# Patient Record
Sex: MALE | Race: WHITE | NOT HISPANIC OR LATINO | URBAN - METROPOLITAN AREA
[De-identification: names, ages, dates, MRNs, and addresses within clinical notes are randomized per-mention and may not be internally consistent; named-entity substitution may affect disease eponyms.]

---

## 2022-03-18 DIAGNOSIS — C20 RECTAL CANCER (H): Primary | ICD-10-CM

## 2022-03-21 NOTE — TELEPHONE ENCOUNTER
Diagnosis, Referred by & from: Recurring Rectal Cancer, post AAPR   Appt date: 4/6/2022   NOTES STATUS DETAILS   OFFICE NOTE from referring provider N/A    OFFICE NOTE from other specialist Care Everywhere [facility]:  3/9/22 - PCC OV with KYM Wilkerson  3/12/21 - PCC OV with Dr. Hunter   DISCHARGE SUMMARY from hospital Care Everywhere [facility]:  12/4/21 - Admission with Dr. Simental  3/18/16 - Admission with Dr. Holt  3/2/16 - Admission with Dr. Sheehan   DISCHARGE REPORT from the ER N/A    OPERATIVE REPORT Care Everywhere [facility]:  3/2/16 - OP Note for ABDOMINOPERINEAL RESECTION with Dr. Sheehan  12/22/12 - OP Note for TRANSANAL EXCISION OF RECTAL VILLOUS, PROCTOSCOPY with Dr. Sheehan   MEDICATION LIST Care Everywhere    LABS     ANAL PAP/CEA [facility]:  (Critical access hospital) 4/2/22 - CEA    [facility]:  3/9/22 - CEA  12/6/21 - CEA     BIOPSIES/PATHOLOGY RELATED TO DIAGNOSIS Received [facility]:  3/11/16 - Colon Biopsy (Case: [id number])  12/24/12 - Rectal Biopsy (Case: [id number])   DIAGNOSTIC PROCEDURES     COLONOSCOPY Care Everywhere [facility]:  12/9/19 - Colonoscopy  6/23/17 - Colonoscopy  4/4/17 - Colonoscopy   FLEX SIGMOIDOSCOPY Care Everywhere [facility]:  12/20/12 - Flexible Sigmoidoscopy   IMAGING (DISC & REPORT)      CT Received [facility]:  3/18/22 - PET/CT  12/4/21 - CT Abd/Pelvis  3/22/21 - CT Chest/Abd/Pelvis  8/11/20 - CT Chest/Abd/Pelvis  4/8/19 - CT Chest/Abd/Pelvis  11/13/17 - CT Chest/Abd/Pelvis  3/24/17 - CT Chest/Abd/Pelvis   MRI [facility]:  (Critical access hospital) 4/2/22 - MRI Rectum     Records Requested  03/21/22    Facility  [facility]  Fax: [phone]  [facility]  Fax: [phone]   Outcome * 3/21/22 9:10 AM Faxed urg req to [facility] for records to be faxed to the clinic. - Thelma    * 3/25/22 7:47 AM Images received from [facility] and attached to the patient in PACs.  Faxed 2nd urg req to [facility] and messaged RN about pathology. - Thelma    * 3/30/22 7:48 AM  Records noted in [facility] are actually the [facility] records. No unique records. - Thelma

## 2022-03-25 NOTE — PROGRESS NOTES
Colon and Rectal Surgery Clinic Note    RE: Yariel Mancini.  : 1960.  MANOLO: 2022.    Reason for visit: Recurrent rectal cancer.     HPI: 61 year old male who was originally diagnosed with a large tubulovillous adenoma in 2012 and underwent transanal excision for this.  Final pathology showed a 1.4 cm tubulovillous adenoma with a foci of adenocarcinoma (T1 NX M0 lesion) with no high risk features although there was a 5 mm deep margin.  Derian subsequently underwent long course chemoradiotherapy that was completed in 2013.  Unfortunately, he was diagnosed with a local recurrence in  and underwent APR by Dr. Sheehan on 3/2/2016.  The perineal defect was not closed with a flap.  Final pathology showed a 1.8 cm mucinous adenocarcinoma (low-grade) with 0 out of 14 lymph nodes involved and negative margins.  The radial margin was the closest at 4 mm. Derian did quite well after this and did not receive any further chemotherapy.  He had adequate surveillance with no evidence of recurrent disease until late  when he started having obstructive symptoms.  He was admitted to [facility] with a small bowel obstruction that responded well to NG tube decompression.  He does continue to have intermittent mild obstructive symptoms that relieved with dietary modifications, mainly low residue diet.  He also does weekly stoma irrigation which has helped as well.  This has been associated with a mild increase in his CEA, going from 0.6-0.9.  This also has been associated with radiologic evidence of possible recurrence of his rectal cancer at the S3/S4 level.  Otherwise he denies any issues with weight loss, urinary dysfunction, stoma function.  Denies any pouching issues.  No blood per stoma.  Derian is otherwise healthy and does not have any major medical issues.  His family history is significant for colon cancer in his father and paternal grandfather.  He did have full genetic testing at  "the time of his initial cancer diagnosis which was negative per the patient.      12/9/19 Colonoscopy        12/4/21 CT A/P      3/18/22 PET Whole Body      4/2/22 MR Rectum:  IMPRESSION:   In this patient with a history of rectal carcinoma status post  abdominoperineal resection and adjuvant chemoradiation therapy:     1. Findings suspicious for disease recurrence. Peripherally enhancing  mass in the soft tissues anterior to the sacrum with invasion of S4  with likely involvement of the left S3 and S4 nerves as they exit the  sacrum.  2. No suspicious perirectal lymph nodes.      CEA (4/2/22): 1.6.    Medical history:  No past medical history on file.    Surgical history:  No past surgical history on file.    Family history:  No family history on file.    Medications:  Current Outpatient Medications   Medication Sig Dispense Refill     calcium carbonate 500 mg, elemental, 1250 (500 Ca) MG tablet chewable Take 2 tablets by mouth       diphenhydrAMINE (BENADRYL) 25 MG capsule Take 25 mg by mouth       hydrochlorothiazide (HYDRODIURIL) 25 MG tablet Take 25 mg by mouth       ibuprofen (ADVIL/MOTRIN) 200 MG tablet Take 600 mg by mouth       melatonin 1 MG TABS tablet Take 1 mg by mouth nightly as needed for sleep       pseudoePHEDrine (SUDAFED) 30 MG tablet Take 60 mg by mouth         Allergies:  No Known Allergies    Social history:   Social History     Tobacco Use     Smoking status: Never Smoker     Smokeless tobacco: Never Used   Substance Use Topics     Alcohol use: Not on file     Marital status: .    Physical Examination:  BP (!) 131/92 (BP Location: Left arm, Patient Position: Sitting, Cuff Size: Adult Regular)   Pulse 104   Ht 5' 11\"   Wt 200 lb 14.4 oz   SpO2 97%   BMI 28.02 kg/m    General: Well hydrated. No acute distress.    Investigations:  None.    Procedures:  None.    ASSESSMENT  61-year-old male with recurrent rectal cancer status post initial transanal excision of a T1 lesion with close " margins with subsequent adjuvant chemoradiotherapy.  After 2 years developed recurrent disease and underwent APR. unfortunately he has developed another recurrence after 6 years with appropriate surveillance.  This is a posterior recurrence that appears to be confined to the ventral surface of S3/S4.  No clear evidence of distant metastatic disease.  Good functional status. Risks, benefits, and alternatives of operative treatment were thoroughly discussed with the patient, he/she understands these well and agrees to proceed.    PLAN  1.  We will discuss at multidisciplinary rectal cancer conference.  2.  Schedule colonoscopy with me.    3.  Refer to neurosurgery/Ortho and Plastic Surgery for potential upcoming posterior pelvic exenteration with sacrectomy and pelvic reconstruction.  4.  Schedule cystoscopy with bilateral ureteral stents, open excision of pelvic recurrence, possible small bowel resection, possible sacrectomy, possible flap reconstruction, possible HIPEC.  Will need PAC, full set of labs, and mechanical bowel prep with antibiotics.    60 minutes spent on the date of encounter (excluding time performing procedures with or without biopsy) performing chart review, history and exam, documentation and further activities as noted above.      Sanjiv Davis M.D., M.Sc.     Division of Colon and Rectal Surgery  [facility]    Referring Provider:  Timoteo Sheehan MD     Primary Care Provider:  No primary care provider on file.

## 2022-04-02 ENCOUNTER — ANCILLARY PROCEDURE (OUTPATIENT)
Dept: MRI IMAGING | Facility: CLINIC | Age: 62
End: 2022-04-02
Attending: COLON & RECTAL SURGERY
Payer: COMMERCIAL

## 2022-04-02 ENCOUNTER — LAB (OUTPATIENT)
Dept: LAB | Facility: CLINIC | Age: 62
End: 2022-04-02
Payer: COMMERCIAL

## 2022-04-02 DIAGNOSIS — C20 RECTAL CANCER (H): ICD-10-CM

## 2022-04-02 LAB
ALBUMIN SERPL-MCNC: 3.2 G/DL (ref 3.4–5)
ALP SERPL-CCNC: 49 U/L (ref 40–150)
ALT SERPL W P-5'-P-CCNC: 42 U/L (ref 0–70)
ANION GAP SERPL CALCULATED.3IONS-SCNC: 9 MMOL/L (ref 3–14)
AST SERPL W P-5'-P-CCNC: 20 U/L (ref 0–45)
BASOPHILS # BLD AUTO: 0 10E3/UL (ref 0–0.2)
BASOPHILS NFR BLD AUTO: 1 %
BILIRUB SERPL-MCNC: 0.3 MG/DL (ref 0.2–1.3)
BUN SERPL-MCNC: 11 MG/DL (ref 7–30)
CALCIUM SERPL-MCNC: 8.3 MG/DL (ref 8.5–10.1)
CEA SERPL-MCNC: 1.6 UG/L (ref 0–2.5)
CHLORIDE BLD-SCNC: 105 MMOL/L (ref 94–109)
CO2 SERPL-SCNC: 28 MMOL/L (ref 20–32)
CREAT SERPL-MCNC: 0.95 MG/DL (ref 0.66–1.25)
EOSINOPHIL # BLD AUTO: 0.1 10E3/UL (ref 0–0.7)
EOSINOPHIL NFR BLD AUTO: 1 %
ERYTHROCYTE [DISTWIDTH] IN BLOOD BY AUTOMATED COUNT: 13.2 % (ref 10–15)
GFR SERPL CREATININE-BSD FRML MDRD: >90 ML/MIN/1.73M2
GLUCOSE BLD-MCNC: 97 MG/DL (ref 70–99)
HCT VFR BLD AUTO: 44.5 % (ref 40–53)
HGB BLD-MCNC: 14.7 G/DL (ref 13.3–17.7)
IMM GRANULOCYTES # BLD: 0 10E3/UL
IMM GRANULOCYTES NFR BLD: 1 %
LYMPHOCYTES # BLD AUTO: 1.4 10E3/UL (ref 0.8–5.3)
LYMPHOCYTES NFR BLD AUTO: 26 %
MCH RBC QN AUTO: 28.5 PG (ref 26.5–33)
MCHC RBC AUTO-ENTMCNC: 33 G/DL (ref 31.5–36.5)
MCV RBC AUTO: 86 FL (ref 78–100)
MONOCYTES # BLD AUTO: 0.5 10E3/UL (ref 0–1.3)
MONOCYTES NFR BLD AUTO: 10 %
NEUTROPHILS # BLD AUTO: 3.4 10E3/UL (ref 1.6–8.3)
NEUTROPHILS NFR BLD AUTO: 61 %
NRBC # BLD AUTO: 0 10E3/UL
NRBC BLD AUTO-RTO: 0 /100
PLATELET # BLD AUTO: 166 10E3/UL (ref 150–450)
POTASSIUM BLD-SCNC: 3.7 MMOL/L (ref 3.4–5.3)
PROT SERPL-MCNC: 6.5 G/DL (ref 6.8–8.8)
RBC # BLD AUTO: 5.16 10E6/UL (ref 4.4–5.9)
SODIUM SERPL-SCNC: 142 MMOL/L (ref 133–144)
WBC # BLD AUTO: 5.4 10E3/UL (ref 4–11)

## 2022-04-02 PROCEDURE — 80053 COMPREHEN METABOLIC PANEL: CPT | Performed by: PATHOLOGY

## 2022-04-02 PROCEDURE — 82378 CARCINOEMBRYONIC ANTIGEN: CPT | Mod: 90 | Performed by: PATHOLOGY

## 2022-04-02 PROCEDURE — A9585 GADOBUTROL INJECTION: HCPCS | Performed by: RADIOLOGY

## 2022-04-02 PROCEDURE — 84134 ASSAY OF PREALBUMIN: CPT | Mod: 90 | Performed by: PATHOLOGY

## 2022-04-02 PROCEDURE — 85025 COMPLETE CBC W/AUTO DIFF WBC: CPT | Performed by: PATHOLOGY

## 2022-04-02 PROCEDURE — 74183 MRI ABD W/O CNTR FLWD CNTR: CPT | Mod: GC | Performed by: RADIOLOGY

## 2022-04-02 PROCEDURE — 36415 COLL VENOUS BLD VENIPUNCTURE: CPT | Performed by: PATHOLOGY

## 2022-04-02 PROCEDURE — 99000 SPECIMEN HANDLING OFFICE-LAB: CPT | Performed by: PATHOLOGY

## 2022-04-02 RX ORDER — GADOBUTROL 604.72 MG/ML
10 INJECTION INTRAVENOUS ONCE
Status: COMPLETED | OUTPATIENT
Start: 2022-04-02 | End: 2022-04-02

## 2022-04-02 RX ADMIN — GADOBUTROL 9 ML: 604.72 INJECTION INTRAVENOUS at 09:37

## 2022-04-04 LAB — PREALB SERPL IA-MCNC: 27 MG/DL (ref 15–45)

## 2022-04-06 ENCOUNTER — PRE VISIT (OUTPATIENT)
Dept: SURGERY | Facility: CLINIC | Age: 62
End: 2022-04-06
Payer: COMMERCIAL

## 2022-04-06 ENCOUNTER — OFFICE VISIT (OUTPATIENT)
Dept: SURGERY | Facility: CLINIC | Age: 62
End: 2022-04-06
Payer: COMMERCIAL

## 2022-04-06 VITALS
SYSTOLIC BLOOD PRESSURE: 131 MMHG | BODY MASS INDEX: 28.13 KG/M2 | HEART RATE: 104 BPM | OXYGEN SATURATION: 97 % | WEIGHT: 200.9 LBS | HEIGHT: 71 IN | DIASTOLIC BLOOD PRESSURE: 92 MMHG

## 2022-04-06 DIAGNOSIS — C20 RECTAL CANCER (H): Primary | ICD-10-CM

## 2022-04-06 PROCEDURE — 99205 OFFICE O/P NEW HI 60 MIN: CPT | Performed by: COLON & RECTAL SURGERY

## 2022-04-06 RX ORDER — IBUPROFEN 200 MG
600 TABLET ORAL EVERY 6 HOURS PRN
COMMUNITY

## 2022-04-06 RX ORDER — CALCIUM CARBONATE 500(1250)
2 TABLET,CHEWABLE ORAL DAILY PRN
COMMUNITY

## 2022-04-06 RX ORDER — ONDANSETRON 4 MG/1
4 TABLET, FILM COATED ORAL EVERY 6 HOURS
Qty: 3 TABLET | Refills: 0 | Status: SHIPPED | OUTPATIENT
Start: 2022-04-06 | End: 2022-04-07

## 2022-04-06 RX ORDER — PSEUDOEPHEDRINE HCL 30 MG
60 TABLET ORAL EVERY 6 HOURS PRN
COMMUNITY

## 2022-04-06 RX ORDER — DIPHENHYDRAMINE HCL 25 MG
25 CAPSULE ORAL
COMMUNITY

## 2022-04-06 RX ORDER — HYDROCHLOROTHIAZIDE 25 MG/1
25 TABLET ORAL EVERY MORNING
COMMUNITY
Start: 2022-03-09

## 2022-04-06 RX ORDER — POLYETHYLENE GLYCOL 3350 17 G/17G
238 POWDER, FOR SOLUTION ORAL SEE ADMIN INSTRUCTIONS
Qty: 14 PACKET | Refills: 0 | Status: SHIPPED | OUTPATIENT
Start: 2022-04-06 | End: 2022-04-07

## 2022-04-06 RX ORDER — NEOMYCIN SULFATE 500 MG/1
1000 TABLET ORAL EVERY 6 HOURS
Qty: 6 TABLET | Refills: 0 | Status: SHIPPED | OUTPATIENT
Start: 2022-04-06 | End: 2022-04-07

## 2022-04-06 RX ORDER — METRONIDAZOLE 500 MG/1
500 TABLET ORAL EVERY 6 HOURS
Qty: 3 TABLET | Refills: 0 | Status: SHIPPED | OUTPATIENT
Start: 2022-04-06 | End: 2022-04-07

## 2022-04-06 ASSESSMENT — PAIN SCALES - GENERAL: PAINLEVEL: NO PAIN (0)

## 2022-04-06 NOTE — LETTER
2022       RE: Yariel Mancini  [address]  [facility] [address]     Dear Colleague,    Thank you for referring your patient, Yariel Mancini, to the [facility] [place] at [facility]. Please see a copy of my visit note below.    Colon and Rectal Surgery Clinic Note    RE: Yariel Mancini.  : 1960.  MANOLO: 2022.    Reason for visit: Recurrent rectal cancer.     HPI: 61 year old male who was originally diagnosed with a large tubulovillous adenoma in 2012 and underwent transanal excision for this.  Final pathology showed a 1.4 cm tubulovillous adenoma with a foci of adenocarcinoma (T1 NX M0 lesion) with no high risk features although there was a 5 mm deep margin.  Derian subsequently underwent long course chemoradiotherapy that was completed in 2013.  Unfortunately, he was diagnosed with a local recurrence in  and underwent APR by Dr. Sheehan on 3/2/2016.  The perineal defect was not closed with a flap.  Final pathology showed a 1.8 cm mucinous adenocarcinoma (low-grade) with 0 out of 14 lymph nodes involved and negative margins.  The radial margin was the closest at 4 mm. Derian did quite well after this and did not receive any further chemotherapy.  He had adequate surveillance with no evidence of recurrent disease until late  when he started having obstructive symptoms.  He was admitted to [facility] with a small bowel obstruction that responded well to NG tube decompression.  He does continue to have intermittent mild obstructive symptoms that relieved with dietary modifications, mainly low residue diet.  He also does weekly stoma irrigation which has helped as well.  This has been associated with a mild increase in his CEA, going from 0.6-0.9.  This also has been associated with radiologic evidence of possible recurrence of his rectal cancer at the S3/S4  level.  Otherwise he denies any issues with weight loss, urinary dysfunction, stoma function.  Denies any pouching issues.  No blood per stoma.  Derian is otherwise healthy and does not have any major medical issues.  His family history is significant for colon cancer in his father and paternal grandfather.  He did have full genetic testing at the time of his initial cancer diagnosis which was negative per the patient.      12/9/19 Colonoscopy        12/4/21 CT A/P      3/18/22 PET Whole Body      4/2/22 MR Rectum:  IMPRESSION:   In this patient with a history of rectal carcinoma status post  abdominoperineal resection and adjuvant chemoradiation therapy:     1. Findings suspicious for disease recurrence. Peripherally enhancing  mass in the soft tissues anterior to the sacrum with invasion of S4  with likely involvement of the left S3 and S4 nerves as they exit the  sacrum.  2. No suspicious perirectal lymph nodes.      CEA (4/2/22): 1.6.    Medical history:  No past medical history on file.    Surgical history:  No past surgical history on file.    Family history:  No family history on file.    Medications:  Current Outpatient Medications   Medication Sig Dispense Refill     calcium carbonate 500 mg, elemental, 1250 (500 Ca) MG tablet chewable Take 2 tablets by mouth       diphenhydrAMINE (BENADRYL) 25 MG capsule Take 25 mg by mouth       hydrochlorothiazide (HYDRODIURIL) 25 MG tablet Take 25 mg by mouth       ibuprofen (ADVIL/MOTRIN) 200 MG tablet Take 600 mg by mouth       melatonin 1 MG TABS tablet Take 1 mg by mouth nightly as needed for sleep       pseudoePHEDrine (SUDAFED) 30 MG tablet Take 60 mg by mouth         Allergies:  No Known Allergies    Social history:   Social History     Tobacco Use     Smoking status: Never Smoker     Smokeless tobacco: Never Used   Substance Use Topics     Alcohol use: Not on file     Marital status: .    Physical Examination:  BP (!) 131/92 (BP Location: Left arm, Patient  "Position: Sitting, Cuff Size: Adult Regular)   Pulse 104   Ht 5' 11\"   Wt 200 lb 14.4 oz   SpO2 97%   BMI 28.02 kg/m    General: Well hydrated. No acute distress.    Investigations:  None.    Procedures:  None.    ASSESSMENT  61-year-old male with recurrent rectal cancer status post initial transanal excision of a T1 lesion with close margins with subsequent adjuvant chemoradiotherapy.  After 2 years developed recurrent disease and underwent APR. unfortunately he has developed another recurrence after 6 years with appropriate surveillance.  This is a posterior recurrence that appears to be confined to the ventral surface of S3/S4.  No clear evidence of distant metastatic disease.  Good functional status. Risks, benefits, and alternatives of operative treatment were thoroughly discussed with the patient, he/she understands these well and agrees to proceed.    PLAN  1.  We will discuss at multidisciplinary rectal cancer conference.  2.  Schedule colonoscopy with me.    3.  Refer to neurosurgery/Ortho and Plastic Surgery for potential upcoming posterior pelvic exenteration with sacrectomy and pelvic reconstruction.  4.  Schedule cystoscopy with bilateral ureteral stents, open excision of pelvic recurrence, possible small bowel resection, possible sacrectomy, possible flap reconstruction, possible HIPEC.  Will need PAC, full set of labs, and mechanical bowel prep with antibiotics.    60 minutes spent on the date of encounter (excluding time performing procedures with or without biopsy) performing chart review, history and exam, documentation and further activities as noted above.      Sanjiv Davis M.D., M.Sc.     Division of Colon and Rectal Surgery  [facility]    Referring Provider:  Timoteo Sheehan MD     Primary Care Provider:  No primary care provider on file.  "

## 2022-04-06 NOTE — PATIENT INSTRUCTIONS
Follow up:  1. Follow up with  Neurosurgery and Plastics surgery   2. Colonoscopy with Dr. Davis as soon as possible.   Please call with any questions or concerns regarding your clinic visit today.    It is a pleasure being involved in your health care.    Contacts post-consultation depending on your need:    Radiology Appointments [phone]    Schedule Clinic Appointments [phone] # 1   M-F 7:30 - 5 pm    SARBJIT Padilla [phone]    Clinic Fax Number [phone]    Surgery Scheduling [phone]    My Chart is available 24 hours a day and is a secure way to access your records and communicate with your care team.  I strongly recommend signing up if you haven't already done so, if you are comfortable with computers.  If you would like to inquire about this or are having problems with My Chart access, you may call [phone] or go online at [email or web address].  Please allow at least 24 hours for a response and extra time on weekends and Holidays.

## 2022-04-06 NOTE — NURSING NOTE
"Chief Complaint   Patient presents with     Cancer       Vitals:    04/06/22 1358   BP: (!) 131/92   BP Location: Left arm   Patient Position: Sitting   Cuff Size: Adult Regular   Pulse: 104   SpO2: 97%   Weight: 200 lb 14.4 oz   Height: 5' 11\"       Body mass index is 28.02 kg/m .    Skylar Wood CMA    "

## 2022-04-07 ENCOUNTER — ANESTHESIA EVENT (OUTPATIENT)
Dept: SURGERY | Facility: CLINIC | Age: 62
End: 2022-04-07

## 2022-04-07 ENCOUNTER — TELEPHONE (OUTPATIENT)
Dept: SURGERY | Facility: CLINIC | Age: 62
End: 2022-04-07
Payer: COMMERCIAL

## 2022-04-07 ENCOUNTER — OFFICE VISIT (OUTPATIENT)
Dept: SURGERY | Facility: CLINIC | Age: 62
End: 2022-04-07
Payer: COMMERCIAL

## 2022-04-07 VITALS
SYSTOLIC BLOOD PRESSURE: 149 MMHG | DIASTOLIC BLOOD PRESSURE: 89 MMHG | HEART RATE: 69 BPM | BODY MASS INDEX: 28.36 KG/M2 | OXYGEN SATURATION: 98 % | TEMPERATURE: 98 F | RESPIRATION RATE: 16 BRPM | WEIGHT: 202.6 LBS | HEIGHT: 71 IN

## 2022-04-07 DIAGNOSIS — Z01.818 PRE-OP EVALUATION: Primary | ICD-10-CM

## 2022-04-07 PROCEDURE — 99203 OFFICE O/P NEW LOW 30 MIN: CPT | Performed by: PHYSICIAN ASSISTANT

## 2022-04-07 RX ORDER — METRONIDAZOLE 500 MG/1
500 TABLET ORAL EVERY 6 HOURS
Qty: 3 TABLET | Refills: 0 | Status: SHIPPED | OUTPATIENT
Start: 2022-04-07

## 2022-04-07 RX ORDER — POLYETHYLENE GLYCOL 3350 17 G/17G
238 POWDER, FOR SOLUTION ORAL SEE ADMIN INSTRUCTIONS
Qty: 14 PACKET | Refills: 0 | Status: SHIPPED | OUTPATIENT
Start: 2022-04-07

## 2022-04-07 RX ORDER — ONDANSETRON 4 MG/1
4 TABLET, FILM COATED ORAL EVERY 6 HOURS
Qty: 3 TABLET | Refills: 0 | Status: SHIPPED | OUTPATIENT
Start: 2022-04-07

## 2022-04-07 RX ORDER — NEOMYCIN SULFATE 500 MG/1
1000 TABLET ORAL EVERY 6 HOURS
Qty: 6 TABLET | Refills: 0 | Status: SHIPPED | OUTPATIENT
Start: 2022-04-07

## 2022-04-07 ASSESSMENT — LIFESTYLE VARIABLES: TOBACCO_USE: 0

## 2022-04-07 ASSESSMENT — PAIN SCALES - GENERAL: PAINLEVEL: NO PAIN (0)

## 2022-04-07 NOTE — TELEPHONE ENCOUNTER
Tier Case Request:    Patient Name: Yariel Mancini   MRN: [id number]   Case#: [id number]   Surgeon(s) and Role:      * Sanjiv Davis MD - Primary   Date requested: * No dates entered *   Location: [facility]   Procedure(s):   EXAM UNDER ANESTHESIA, ANUS, COLONOSCOPY    Additional Instructions for the Case  Multi-surgeon case:  NO  Time in minutes:  45  Anesthesia: MAC  Prep: FULL BOWEL  Pre-Op: PCP  Labs: NO  WOC: NO  Special equipment: NO  Special Instructions: 4/15 AT [facility]*    Additional Instructions for the Case  Multi-surgeon case:  NO  Time in minutes:  45  Anesthesia: MAC  Prep: FULL BOWEL  Pre-Op: PCP  Labs: NO  WOC: NO  Special equipment: NO  Special Instructions: 4/15 AT [facility]*    Spoke with Aissatou at the [facility], was able to reserve a colonoscope on 4/15 at 3:00 PM.    Spoke with patient via phone, completed the following scheduling, then sent Surgery Packet to patient via Mail including related scheduling information and prep instructions:    Your surgery is scheduled:    Date: Friday April 15, 2022  ________________________________    Time: 3:00 PM*  ________________________________    Please arrive at:  1:30 PM*  ______________________    Surgeon's Name: Dr. Sanjiv Davis  _______________________  Adult  required upon discharge      Pre-Op Physical Fax Numbers:         [facility]  [facility]  Phone: [phone]  Fax: [phone]        Your surgery is located at:  [facility]  [address], [address]  [place] [address]    Nurse Line: [phone]  [phone]     *Times are tentative and may change. You can expect a call from the pre-admission nurses to confirm arrival and surgery start time if the times should change.     Required: Yes, you will need a  18 years or older to drive you home from your procedure as well as stay with you for 24 hours after your procedure    Surgery: Colonoscopy    Preparation:  "MiraLAX/Gatorade, Magnesium Citrate (see \"Day Before Surgery\")    - If you have diabetes or blood sugar concerns, please use Gatorade low/no sugar, as per recommendation by your physician    Upcoming Appointments:     Pre-operative COVID-19 Test:  - Pre-procedure asymptomatic COVID PCR test not required for patients who are fully vaccinated against COVID at least 14 days before surgery  - Please bring proof of COVID vaccination (e.g., Vaccination Record Card), along with you on surgery date     Apr 07, 2022 12:45 PM     Pre-op History & Physical  (Arrive by 12:30 PM)  PAC EVALUATION with Zuleyka Hills PA-C  [facility] [place] ([facility] ) [phone]    [address] [place] [id number]     Carmen Cody  Mary-op Coordinator  [place]-Rectal Surgery  Direct Phone: [phone]    "

## 2022-04-07 NOTE — H&P
Pre-Operative H & P     CC:  Preoperative exam to assess for increased cardiopulmonary risk while undergoing surgery and anesthesia.    Date of Encounter: 4/7/2022  Primary Care Physician:  No primary care provider on file.     Reason for visit:   Encounter Diagnosis   Name Primary?     Pre-op evaluation Yes       HPI  Yariel Mancini is a 61 year old male who presents for pre-operative H & P in preparation for  Procedure Information     Date/Time: 4/15/22     Procedure: colonoscopy    Anesthesia type: MAC    Pre-op diagnosis: h/o rectal cancer    Location: [facility]    Providers: Susan          Patient is being evaluated for comorbid conditions of Hypertension, shoulder pain    Mr. 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 He is s/p transanal excision. He then completed chemoradiation therapy, completed 3/2013. He was then diagnosed with recurrence in 2015 and APR 3/2016.  He has since been following with adequate surveillance. He then was found to have recurrent disease late 2021. He was referred to Dr. Davis for further evaluation. He is now scheduled for colonoscopy as above. He will be presented at the rectal cancer conference and plans for future surgery.     History is obtained from the patient and chart review    Hx of abnormal bleeding or anti-platelet use: denies      Past Medical History  Past Medical History:   Diagnosis Date     Essential hypertension      H/O malignant neoplasm of rectum        Past Surgical History  Past Surgical History:   Procedure Laterality Date     BOWEL RESECTION       COLONOSCOPY         Prior to Admission Medications  Current Outpatient Medications   Medication Sig Dispense Refill     calcium carbonate 500 mg, elemental, 1250 (500 Ca) MG tablet chewable Take 2 tablets by mouth daily as needed        diphenhydrAMINE (BENADRYL) 25 MG capsule Take 25 mg by mouth nightly as needed        hydrochlorothiazide (HYDRODIURIL) 25 MG  "tablet Take 25 mg by mouth every morning        ibuprofen (ADVIL/MOTRIN) 200 MG tablet Take 600 mg by mouth every 6 hours as needed        melatonin 1 MG TABS tablet Take 1 mg by mouth nightly as needed for sleep       polyethylene glycol (MIRALAX) 17 g packet Take 238 g by mouth See Admin Instructions Start at 4 pm night prior to colonoscopy. Refer to \"Getting Ready for Colonoscopy\" instructions. (Patient taking differently: Take 17 g by mouth daily ) 14 packet 0     pseudoePHEDrine (SUDAFED) 30 MG tablet Take 60 mg by mouth every 6 hours as needed        magnesium citrate solution Take 296 mLs by mouth once for 1 dose Start at 8 pm night before surgery, unless otherwise stated in \"Getting Ready for Surgery\" instruction (Patient not taking: Reported on 4/7/2022) 296 mL 0     magnesium citrate solution Take 296 mLs by mouth once for 1 dose Start at 8 pm night before colonoscopy,refer to \"Getting Ready for Colonoscopy\" instructions. (Patient not taking: Reported on 4/7/2022) 296 mL 0     metroNIDAZOLE (FLAGYL) 500 MG tablet Take 1 tablet (500 mg) by mouth every 6 hours At 8:00 am, 2:00 pm, 8:00 pm the day prior to your surgery with neomycin and zofran. (Patient not taking: Reported on 4/7/2022) 3 tablet 0     neomycin (MYCIFRADIN) 500 MG tablet Take 2 tablets (1,000 mg) by mouth every 6 hours At 8:00 am, 2:00 pm, 8:00 pm the day prior to your surgery with flagyl and zofran. (Patient not taking: Reported on 4/7/2022) 6 tablet 0     ondansetron (ZOFRAN) 4 MG tablet Take 1 tablet (4 mg) by mouth every 6 hours At 8:00 am, 2:00 pm, 8:00 pm the day prior to your surgery with neomycin and flagyl. (Patient not taking: Reported on 4/7/2022) 3 tablet 0     polyethylene glycol (MIRALAX) 17 g packet Take 238 g by mouth See Admin Instructions Starting at 4 pm night prior to surgery. Refer to \"Getting Ready for Surgery\" instructions. (Patient not taking: Reported on 4/7/2022) 14 packet 0       Allergies  No Known " Allergies    Social History  Social History     Socioeconomic History     Marital status:      Spouse name: Not on file     Number of children: Not on file     Years of education: Not on file     Highest education level: Not on file   Occupational History     Not on file   Tobacco Use     Smoking status: Never Smoker     Smokeless tobacco: Never Used   Substance and Sexual Activity     Alcohol use: Yes     Comment: occasional     Drug use: Not Currently     Sexual activity: Not on file   Other Topics Concern     Not on file   Social History Narrative     Not on file     Social Determinants of Health     Financial Resource Strain: Not on file   Food Insecurity: Not on file   Transportation Needs: Not on file   Physical Activity: Not on file   Stress: Not on file   Social Connections: Not on file   Intimate Partner Violence: Not on file   Housing Stability: Not on file       Family History  Family History   Problem Relation Age of Onset     Anesthesia Reaction No family hx of      Deep Vein Thrombosis (DVT) No family hx of        Review of Systems  The complete review of systems is negative other than noted in the HPI or here.   Anesthesia Evaluation   Pt has had prior anesthetic.     No history of anesthetic complications       ROS/MED HX  ENT/Pulmonary:  - neg pulmonary ROS  (-) tobacco use   Neurologic:  - neg neurologic ROS     Cardiovascular:     (+) hypertension-----  Echo: Date: Results:    Stress Test: Date: Results:    ECG Reviewed: Date: 9/2020 Results:  SR, voltage criteria LVH  Cath: Date: Results:   (-) taking anticoagulants/antiplatelets   METS/Exercise Tolerance: >4 METS Comment: Yoga, PT, can walk multiple blocks and ascend stairs without exertional symptoms    Hematologic:  - neg hematologic  ROS  (-) history of blood clots and history of blood transfusion   Musculoskeletal: Comment: right shoulder pain      GI/Hepatic: Comment: S/p bowel resection    (+) GERD (intermittnet, pepcid PRN),    "  Renal/Genitourinary:  - neg Renal ROS     Endo:  - neg endo ROS  (-) chronic steroid usage   Psychiatric/Substance Use:  - neg psychiatric ROS     Infectious Disease:  - neg infectious disease ROS     Malignancy:   (+) Malignancy, History of GI.GI CA status post Surgery, Chemo and Radiation.        Other:            BP (!) 149/89 (BP Location: Left arm, Patient Position: Sitting, Cuff Size: Adult Regular)   Pulse 69   Temp 98  F (36.7  C) (Oral)   Resp 16   Ht 1.803 m (5' 11\")   Wt 91.9 kg (202 lb 9.6 oz)   SpO2 98%   BMI 28.26 kg/m      Physical Exam   Constitutional: Awake, alert, cooperative, no apparent distress, and appears stated age.  Eyes: Pupils equal, round and reactive to light, extra ocular muscles intact, sclera clear, conjunctiva normal.  HENT: Normocephalic, oral pharynx with moist mucus membranes, good dentition  Respiratory: Clear to auscultation bilaterally, no crackles or wheezing.  Cardiovascular: Regular rate and rhythm, normal S1 and S2, and no murmur noted.  Carotids no bruits. No edema. Palpable pulses to radial arteries.   GI: Normal bowel sounds, soft, non-distended, non-tender. Ostomy present  Genitourinary:  deferred  Skin: Warm and dry.  Musculoskeletal: Full ROM of neck. There is no redness, warmth, or swelling of the exposed joints. Gross motor strength is normal.    Neurologic: Awake, alert, oriented to name, place and time. Cranial nerves II-XII are grossly intact. Gait is normal.   Neuropsychiatric: Calm, cooperative. Normal affect.     Prior Labs/Diagnostic Studies   All labs and imaging personally reviewed     EKG/ stress test - if available please see in ROS above   No results found.  No flowsheet data found.      The patient's records and results personally reviewed by this provider.     Outside records reviewed from: Care Everywhere    Assessment      Yariel Mancini is a 61 year old male seen as a PAC referral for risk assessment and optimization for " "anesthesia.    Plan/Recommendations  Pt will be optimized for the proposed procedure.  See below for details on the assessment, risk, and preoperative recommendations    NEUROLOGY  - No history of TIA, CVA or seizure  -Post Op delirium risk factors:  No risk identified    ENT  - No current airway concerns.  Will need to be reassessed day of surgery.  Mallampati: I  TM: > 3    CARDIAC  - Hypertension, initially reading 151/100, repeat 149/89. Forgot his hydrochlorothiazide before coming in today. Will take when he gets home.   -denies cardiac history or symptoms   - METS (Metabolic Equivalents)  Patient performs 4 or more METS exercise without symptoms            Total Score: 0      RCRI-Very low risk: Class 1 0.4% complication rate            Total Score: 0        PULMONARY  JOSE MANUEL Medium Risk            Total Score: 3    JOSE MANUEL: Hypertension    JOSE MANUEL: Over 50 ys old    JOSE MANUEL: Male      - Denies asthma or inhaler use  - Tobacco History      History   Smoking Status     Never Smoker   Smokeless Tobacco     Never Used       GI  PONV Low Risk  Total Score: 1           1 AN PONV: Patient is not a current smoker        /RENAL  - Baseline Creatinine  WNL    ENDOCRINE    - BMI: Estimated body mass index is 28.26 kg/m  as calculated from the following:    Height as of this encounter: 1.803 m (5' 11\").    Weight as of this encounter: 91.9 kg (202 lb 9.6 oz).  Overweight (BMI 25.0-29.9)  - No history of Diabetes Mellitus    HEME  VTE Medium Risk 1.8%            Total Score: 7    VTE: Male    VTE: Current cancer        ONC  H/o rectal cancer s/p surgery, chemo and radiation. Now with concern for recurrence with colonoscopy planned as above. Of note, there is a plan for another future procedure, not yet shceduled    The patient is optimized for their procedure. AVS with information on surgery time/arrival time, meds and NPO status given by nursing staff. No further diagnostic testing indicated.    On the day of service:     Prep time: " 13 minutes  Visit time: 15 minutes  Documentation time: 9 minutes  ------------------------------------------  Total time: 37 minutes      Zuleyka Hills PA-C  Preoperative Assessment Center  [facility]  Clinic and Surgery Center  Phone: [phone]  Fax: [phone]

## 2022-04-08 NOTE — TELEPHONE ENCOUNTER
FUTURE VISIT INFORMATION      FUTURE VISIT INFORMATION:    Date: 4/20/22    Time: 10:00am    [place] [facility] [place] INFORMATION:    Referring provider:  Sanjiv Davis MD    Referring providers clinic:  [facility]    Reason for visit/diagnosis  Combo with Colorectal    RECORDS REQUESTED FROM:       Clinic name Comments Records Status Imaging Status   [facility] Ov/referral 4/6/22 EPIC

## 2022-04-13 ENCOUNTER — TELEPHONE (OUTPATIENT)
Dept: SURGERY | Facility: CLINIC | Age: 62
End: 2022-04-13
Payer: COMMERCIAL

## 2022-04-13 NOTE — TELEPHONE ENCOUNTER
Spoke with patient and sent [employer] message with surgery start time and arrival time on  4/15, which changed again due to Dr. Davis's availability.    Carmen Cody  Mary-op Coordinator  [place]  Direct Phone: [phone]

## 2022-04-13 NOTE — TELEPHONE ENCOUNTER
"Spoke with patient via phone, explained that - due to an equipment conflict - his arrival time and start time on Friday have changed. I provided him with the info included below in his Surgery Packet sent to him via [facility] and Mail:    Your surgery is scheduled:    Date: Friday April 15 2022  ________________________________    Time: 12:30 PM*  ________________________________    Please arrive at:  11:00 AM*  ______________________    Surgeon's Name: Dr. Sanjiv Davis  _______________________  Adult  required upon discharge      Pre-Op Physical Fax Numbers:         [facility]  [facility]  Phone: [phone]  Fax: [phone]        Your surgery is located at:  [facility]  [address], [address]  [place] [address]    Nurse Line: [phone]  [phone]     *Times are tentative and may change. You can expect a call from the pre-admission nurses to confirm arrival and surgery start time if the times should change.     Required: Yes, you will need a  18 years or older to drive you home from your procedure as well as stay with you for 24 hours after your procedure    Surgery: Colonoscopy    Preparation: MiraLAX/Gatorade, Magnesium Citrate (see \"Day Before Surgery\")    - If you have diabetes or blood sugar concerns, please use Gatorade low/no sugar, as per recommendation by your physician    Upcoming / Related Appointments:     Pre-operative Physical appointment:   Clinic appointment with Pre-operative Assessment Center (PAC): 2022 at 12:45 PM                                                 [facility]                                                 [address] [place] [address]  Pre-operative COVID-19 Test:  - Pre-procedure asymptomatic COVID PCR Test not required for patients who are fully vaccinated against COVID within 4 days before surgery date.  - Please " bring proof of vaccination (e.g., Vaccination Record Card), along with you on surgery date    Carmen Cody  Mary-op Coordinator  [place]  Direct Phone: [phone]

## 2022-04-14 ENCOUNTER — TELEPHONE (OUTPATIENT)
Dept: NEUROSURGERY | Facility: CLINIC | Age: 62
End: 2022-04-14
Payer: COMMERCIAL

## 2022-04-14 ENCOUNTER — TELEPHONE (OUTPATIENT)
Dept: SURGERY | Facility: CLINIC | Age: 62
End: 2022-04-14
Payer: COMMERCIAL

## 2022-04-14 ENCOUNTER — HOSPITAL ENCOUNTER (INPATIENT)
Facility: CLINIC | Age: 62
Setting detail: SURGERY ADMIT
End: 2022-04-14
Attending: STUDENT IN AN ORGANIZED HEALTH CARE EDUCATION/TRAINING PROGRAM | Admitting: STUDENT IN AN ORGANIZED HEALTH CARE EDUCATION/TRAINING PROGRAM
Payer: COMMERCIAL

## 2022-04-14 NOTE — TELEPHONE ENCOUNTER
Tier 2 Case Request received to schedule:    Patient Name: Yariel Mancini   MRN: [id number]   Case#: [id number]   Surgeon(s) and Role:      * Sanjiv Daivs MD - Primary   Date requested: * No dates entered *   Location: [facility] OR   Procedure(s):   EXCISION, MASS, PELVIS, POSSIBLE HYPERTHERMIC INTRAPERITONEAL CHEMOTHERAPY, POSSIBLE SMALL BOWEL EXCISION, POSSIBLE SACRECTOMY (N/A)   CYTOREDUCTIVE SURGERY, LAPAROSCOPIC, WITH HIPEC

## 2022-04-14 NOTE — TELEPHONE ENCOUNTER
Writer BIB for pt to call back regarding neurosurgery referral    Please schedule a new in person visit with Dr. Ding for next available    Charlene Platt

## 2022-04-20 ENCOUNTER — PREP FOR PROCEDURE (OUTPATIENT)
Dept: SURGERY | Facility: CLINIC | Age: 62
End: 2022-04-20

## 2022-04-20 ENCOUNTER — OFFICE VISIT (OUTPATIENT)
Dept: PLASTIC SURGERY | Facility: CLINIC | Age: 62
End: 2022-04-20
Payer: COMMERCIAL

## 2022-04-20 ENCOUNTER — PRE VISIT (OUTPATIENT)
Dept: SURGERY | Facility: CLINIC | Age: 62
End: 2022-04-20
Payer: COMMERCIAL

## 2022-04-20 DIAGNOSIS — C20 RECTAL CANCER (H): ICD-10-CM

## 2022-04-20 DIAGNOSIS — C20 RECTAL CANCER (H): Primary | ICD-10-CM

## 2022-04-20 PROCEDURE — 99204 OFFICE O/P NEW MOD 45 MIN: CPT | Performed by: PLASTIC SURGERY

## 2022-04-20 RX ORDER — CEFAZOLIN SODIUM 2 G/50ML
2 SOLUTION INTRAVENOUS SEE ADMIN INSTRUCTIONS
Status: CANCELLED | OUTPATIENT
Start: 2022-04-20

## 2022-04-20 RX ORDER — CEFAZOLIN SODIUM 2 G/50ML
2 SOLUTION INTRAVENOUS
Status: CANCELLED | OUTPATIENT
Start: 2022-04-20

## 2022-04-20 ASSESSMENT — PAIN SCALES - GENERAL: PAINLEVEL: NO PAIN (0)

## 2022-04-20 NOTE — LETTER
4/20/2022     RE: Yariel Mancini  [address]  [facility] [facility] [address]     Dear Colleague,    Thank you for referring your patient, Yariel Mancini, to the [facility] PLASTIC [facility] RECONSTRU[facility] SURGERY CLINIC [place] [facility]. Please see a copy of my visit note below.    REFERRING PROVIDER:  Dr. Sanjiv Davis, [facility] Colorectal Surgery.    PRESENTING COMPLAINT:  Consultation for perineal/back reconstruction.     HISTORY OF PRESENTING COMPLAINT:  Mr. Mancini is 61 years old, has a significant rectal cancer history starting in 2012, underwent a transanal excision with chemoradiation therapy, followed by a recurrence requiring APR in 2016, followed by another recurrence recently.  A posterior pelvic exenteration including possible sacrectomy is planned.  I have been consulted in case a sacrectomy is done and a posterior perineal/back reconstruction is required.    PAST MEDICAL HISTORY:  Hypertension.    PAST SURGICAL HISTORY:  As per HPI.    MEDICATIONS:  Hydrochlorothiazide.    ALLERGIES:  Nil.    SOCIAL HISTORY:  Does not smoke, socially drinks.  Lives in [place].  Is a  in a health insurance company.    REVIEW OF SYSTEMS:  Denies chest pain, shortness of breath, MI, CVA, DVT and PE.    PHYSICAL EXAMINATION:  Vital signs are stable.  He is afebrile, in no obvious distress.  He is 5 feet 11 inches, 200 pounds, BMI 28 kg/m2.  On examination of his abdomen, he has a midline incisional hernia.  Skin pinch thickness is about 3 cm.  He has a left-sided ostomy.    ASSESSMENT AND PLAN:  Based upon above findings, a diagnosis of re-recurrent cancer involving the posterior pelvic area was made.  Posterior pelvic exenteration including possible sacrectomy is being undertaken and possible reconstruction will be required.  If indeed it is required, my plan is to use a right rectus abdominis vertical musculocutaneous flap transpelvically to close  the area.  This was discussed with the patient and his wife in detail.  Concept behind a flap and concept of the reconstruction was explained in detail.  All risks, benefits, and alternatives of the procedure including pain, infection, bleeding, scarring, asymmetry, seromas, hematomas, wound breakdown, wound dehiscence, loss of the flap, requirement of further surgeries, injury to deeper structures, DVT, PE, MI, CVA, pneumonia, renal failure and death were all explained.  Additionally, other options including gluteal flaps and thigh flaps were also touched upon but most likely will not be required.  All their questions were answered.  I look forward to helping him out in the near future.  They were happy with the visit.  All exam and discussion from beginning to end done in the presence of my nurse, Ashley Mcnally.    Total time spent with chart review, visit itself and post-visit paperwork was 45 minutes.    CHEN Rivera MD    cc:  Sanjiv Davis MD  [facility]orectal Surgery  [address], [address] [address]

## 2022-04-20 NOTE — NURSING NOTE
Chief Complaint   Patient presents with     Consult     Derian, is being seen today for a consult regarding combo case case with colorectal DOS 3/30/22, as reported by patient.       There were no vitals filed for this visit.    There is no height or weight on file to calculate BMI.      Carmen Tolentino LPN

## 2022-04-20 NOTE — PROGRESS NOTES
REFERRING PROVIDER:  Dr. Sanjiv Davis, [facility]orectal [facility].    PRESENTING COMPLAINT:  Consultation for perineal/back reconstruction.     HISTORY OF PRESENTING COMPLAINT:  Mr. Mancini is 61 years old, has a significant rectal cancer history starting in 2012, underwent a transanal excision with chemoradiation therapy, followed by a recurrence requiring APR in 2016, followed by another recurrence recently.  A posterior pelvic exenteration including possible sacrectomy is planned.  I have been consulted in case a sacrectomy is done and a posterior perineal/back reconstruction is required.    PAST MEDICAL HISTORY:  Hypertension.    PAST SURGICAL HISTORY:  As per HPI.    MEDICATIONS:  Hydrochlorothiazide.    ALLERGIES:  Nil.    SOCIAL HISTORY:  Does not smoke, socially drinks.  Lives in [place].  Is a  in a health insurance company.    REVIEW OF SYSTEMS:  Denies chest pain, shortness of breath, MI, CVA, DVT and PE.    PHYSICAL EXAMINATION:  Vital signs are stable.  He is afebrile, in no obvious distress.  He is 5 feet 11 inches, 200 pounds, BMI 28 kg/m2.  On examination of his abdomen, he has a midline incisional hernia.  Skin pinch thickness is about 3 cm.  He has a left-sided ostomy.    ASSESSMENT AND PLAN:  Based upon above findings, a diagnosis of re-recurrent cancer involving the posterior pelvic area was made.  Posterior pelvic exenteration including possible sacrectomy is being undertaken and possible reconstruction will be required.  If indeed it is required, my plan is to use a right rectus abdominis vertical musculocutaneous flap transpelvically to close the area.  This was discussed with the patient and his wife in detail.  Concept behind a flap and concept of the reconstruction was explained in detail.  All risks, benefits, and alternatives of the procedure including pain, infection, bleeding, scarring, asymmetry, seromas, hematomas, wound breakdown, wound dehiscence, loss of the flap,  requirement of further surgeries, injury to deeper structures, DVT, PE, MI, CVA, pneumonia, renal failure and death were all explained.  Additionally, other options including gluteal flaps and thigh flaps were also touched upon but most likely will not be required.  All their questions were answered.  I look forward to helping him out in the near future.  They were happy with the visit.  All exam and discussion from beginning to end done in the presence of my nurse, Ashley Mcnally.    Total time spent with chart review, visit itself and post-visit paperwork was 45 minutes.    CHEN Rivera MD    cc:  Sanjiv Davis MD  [facility] Colorectal Surgery  [address], [address] [address]

## 2022-04-20 NOTE — TELEPHONE ENCOUNTER
RECORDS RECEIVED FROM: Internal   REASON FOR VISIT: potential upcoming posterior pelvic exenteration with sacrectomy and pelvic reconstruction   Date of Appt: 5/10/22   NOTES (FOR ALL VISITS) STATUS DETAILS   OFFICE NOTE from referring provider Internal Dr Sanjiv Davis @ [facility] Colon Rectal:  4/6/22   DISCHARGE SUMMARY from hospital Care Everywhere [facility]:  12/4/21-12/6/21   MEDICATION LIST Internal    IMAGING  (FOR ALL VISITS)     MRI (HEAD, NECK, SPINE) Internal [facility]:  MRI Rectum 4/2/22   CT (HEAD, NECK, SPINE) Received [facility]:  CT Ab Pelvis 12/4/21  CT Chest Ab Pelvis 3/22/21  CT Chest Ab Pelvis 8/11/20  CT Chest Ab Pelvis 4/8/19  CT Chest Ab Pelvis 11/13/17  CT Chest Ab Pelvis 3/24/17

## 2022-04-21 ENCOUNTER — PREP FOR PROCEDURE (OUTPATIENT)
Dept: UROLOGY | Facility: CLINIC | Age: 62
End: 2022-04-21
Payer: COMMERCIAL

## 2022-05-04 ENCOUNTER — TELEPHONE (OUTPATIENT)
Dept: SURGERY | Facility: CLINIC | Age: 62
End: 2022-05-04
Payer: COMMERCIAL

## 2022-05-04 NOTE — TELEPHONE ENCOUNTER
Patient left  msg stating that the [facility] ER found cancer in his lungs, so they are saying that he has to have Chemo for approximately 3 months before he's able to have surgery. He would like a call from Dr. Davis to discuss/ confirm.    Sent message to Dr. Davis including the above info and requesting that Dr. Davis call patient.    I did try calling patient back to update him, left [facility] [place] that I passed his message along.    Carmen Cody  Mary-op Coordinator  [place]  Direct Phone: [phone]

## 2022-05-10 ENCOUNTER — PRE VISIT (OUTPATIENT)
Dept: SURGERY | Facility: CLINIC | Age: 62
End: 2022-05-10
Payer: COMMERCIAL

## 2022-05-10 ENCOUNTER — PRE VISIT (OUTPATIENT)
Dept: NEUROSURGERY | Facility: CLINIC | Age: 62
End: 2022-05-10
Payer: COMMERCIAL

## 2022-05-13 ENCOUNTER — HOME INFUSION (PRE-WILLOW HOME INFUSION) (OUTPATIENT)
Dept: PHARMACY | Facility: CLINIC | Age: 62
End: 2022-05-13

## 2022-05-16 ENCOUNTER — HEALTH MAINTENANCE LETTER (OUTPATIENT)
Age: 62
End: 2022-05-16

## 2022-05-19 DIAGNOSIS — Z11.59 ENCOUNTER FOR SCREENING FOR OTHER VIRAL DISEASES: Primary | ICD-10-CM

## 2022-08-26 NOTE — PROGRESS NOTES
This is a recent snapshot of the patient's [facility] medical record.  For current drug dose and complete information and questions, call [phone] or In Basket pool, [facility] ([id number])  CSN Number:  [id number]

## 2022-09-11 ENCOUNTER — HEALTH MAINTENANCE LETTER (OUTPATIENT)
Age: 62
End: 2022-09-11

## 2023-04-30 ENCOUNTER — HEALTH MAINTENANCE LETTER (OUTPATIENT)
Age: 63
End: 2023-04-30

## 2024-05-04 ENCOUNTER — HEALTH MAINTENANCE LETTER (OUTPATIENT)
Age: 64
End: 2024-05-04